# Patient Record
Sex: FEMALE | Race: WHITE | URBAN - METROPOLITAN AREA
[De-identification: names, ages, dates, MRNs, and addresses within clinical notes are randomized per-mention and may not be internally consistent; named-entity substitution may affect disease eponyms.]

---
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
## 2020-12-22 ENCOUNTER — HOSPITAL ENCOUNTER (EMERGENCY)
Age: 20
Discharge: HOME | End: 2020-12-23
Attending: EMERGENCY MEDICINE
Payer: COMMERCIAL

## 2020-12-22 ENCOUNTER — APPOINTMENT (OUTPATIENT)
Dept: GENERAL RADIOLOGY | Age: 20
End: 2020-12-22
Payer: COMMERCIAL

## 2020-12-22 VITALS
TEMPERATURE: 98.8 F | WEIGHT: 165 LBS | BODY MASS INDEX: 29.23 KG/M2 | OXYGEN SATURATION: 99 % | HEIGHT: 63 IN | HEART RATE: 62 BPM | RESPIRATION RATE: 16 BRPM | SYSTOLIC BLOOD PRESSURE: 132 MMHG | DIASTOLIC BLOOD PRESSURE: 78 MMHG

## 2020-12-22 PROCEDURE — 12011 RPR F/E/E/N/L/M 2.5 CM/<: CPT

## 2020-12-22 PROCEDURE — 99283 EMERGENCY DEPT VISIT LOW MDM: CPT

## 2020-12-22 PROCEDURE — 70140 X-RAY EXAM OF FACIAL BONES: CPT

## 2020-12-22 ASSESSMENT — PAIN SCALES - GENERAL: PAINLEVEL_OUTOF10: 3

## 2020-12-23 RX ORDER — HYDROCODONE BITARTRATE AND ACETAMINOPHEN 5; 325 MG/1; MG/1
1 TABLET ORAL EVERY 6 HOURS PRN
Qty: 4 TABLET | Refills: 0 | Status: SHIPPED | OUTPATIENT
Start: 2020-12-23 | End: 2020-12-26

## 2020-12-23 RX ORDER — IBUPROFEN 600 MG/1
600 TABLET ORAL EVERY 6 HOURS PRN
Qty: 10 TABLET | Refills: 0 | Status: SHIPPED | OUTPATIENT
Start: 2020-12-23

## 2020-12-23 NOTE — ED PROVIDER NOTES
[address] [place] [address] ED  eMERGENCY dEPARTMENT eNCOUnter      Pt Name: Dorothy Gutiérrez  MRN: [id number]  [place] 2000  Date of evaluation: 12/22/2020  Provider: Geoffery Schwab, MD    [address] [place] [address]       Chief Complaint   Patient presents with    Puncture Wound     next to right eye, hit head on window sill         HISTORY OF PRESENT ILLNESS  (Location/Symptom, Timing/Onset, Context/Setting, Quality, Duration, Modifying Factors, Severity.)   Dorothy Gutiérrez is a 23 y.o. female who presents to the emergency department for evaluation of a laceration lateral to the right eye. Patient states she tripped and fell and struck her head on the edge of a windowsill she has a 1 cm laceration lateral to the right eye. No orbital involvement. Bleeding is controlled on arrival no loss of consciousness. She denies any neck pain. Nursing Notes were reviewed. ALLERGIES     Albuterol sulfate    CURRENT MEDICATIONS       Discharge Medication List as of 12/23/2020 12:26 AM      CONTINUE these medications which have NOT CHANGED    Details   albuterol sulfate HFA (PROAIR HFA) 108 (90 Base) MCG/ACT inhaler Inhale 2 puffs into the lungs every 4 hours as needed for Wheezing or Shortness of Breath, Disp-1 Inhaler, R-0Please let patient know this will be her last refill from our office and she needs to find a family doctor/internistNormal      montelukast (SINGULAIR) 10 MG tablet TAKE 1 TABLET BY MOUTH IN THE EVENING, Disp-30 tablet, R-2Normal             PAST MEDICAL HISTORY         Diagnosis Date    Allergic     Asthma        SURGICAL HISTORY     History reviewed. No pertinent surgical history.       FAMILY HISTORY           Problem Relation Age of Onset    High Blood Pressure Mother     High Cholesterol Mother     High Blood Pressure Maternal Grandmother     High Cholesterol Maternal Grandmother     Other Maternal Grandmother         COPD, Emphysema    Depression Paternal Grandfather      Family Status Relation Name Status    Mother Garth Del Toro Father Stephanie Lopez Sister 5 sisters Alive    Brother 3 brothers Alive    MGM  Alive    MGF  Alive    PGM  Alive    PGF  Alive        SOCIAL HISTORY      reports that she has never smoked. She has never used smokeless tobacco. She reports that she does not drink alcohol or use drugs. REVIEW OF SYSTEMS    (2-9 systems for level 4, 10 or more for level 5)     Review of Systems   All other systems reviewed and are negative. Except as noted above the remainder of the review of systems was reviewed and negative. PHYSICAL EXAM    (up to 7 for level 4, 8 or more for level 5)     Vitals:    12/22/20 2251   BP: 132/78   Pulse: 62   Resp: 16   Temp: 98.8 °F (37.1 °C)   TempSrc: Oral   SpO2: 99%   Weight: 165 lb (74.8 kg)   Height: 5' 3\" (1.6 m)       Physical exam reflects a well-nourished well-hydrated female. She is afebrile with stable vital signs to include pulse ox of 99% on room air. She is not hypoxic. She is alert conversive and appropriate in behavior. Integument warm and dry. Oral pharyngeal exam is without lesion. No difficulty breathing speaking swallowing. Heart regular rate and rhythm normal S1-S2 no murmurs rubs gallops. Lungs are clear to auscultation without wheezes rales or rhonchi. Evaluation of her face demonstrates a 1 cm laceration lateral to the right eye no ocular involvement globe is intact cranial margins distinct visual acuities are grossly normal.  No active bleeding is noted. No instability or crepitance      DIAGNOSTIC RESULTS         RADIOLOGY:   Non-plain film images such as CT, Ultrasound and MRI are read by the radiologist. Plain radiographic images are visualized and preliminarily interpreted by the emergency physician with the below findings:    Interpretation per the Radiologist below, if available at the time of this note:    XR FACIAL BONES (<3 VIEWS)   Final Result   No acute abnormality. LABS:  Labs Reviewed - No data to display    All other labs were within normal range or not returned as of this dictation. EMERGENCY DEPARTMENT COURSE and DIFFERENTIAL DIAGNOSIS/MDM:   Vitals:    Vitals:    12/22/20 2251   BP: 132/78   Pulse: 62   Resp: 16   Temp: 98.8 °F (37.1 °C)   TempSrc: Oral   SpO2: 99%   Weight: 165 lb (74.8 kg)   Height: 5' 3\" (1.6 m)     Wound is evaluated cleansed and closed. Imaging studies are reviewed with regard to potential fracture. Imaging studies are negative for injury. She has no injury to the orbit as stated. She is discharged home on conservative management    CONSULTS:  None    PROCEDURES:  Wound closure: Patient has a 1 cm laceration lateral to the right orbital region. There is no orbital involvement. No active bleeding noted. Wound is closed with adhesive with good margin approximation and hemostasis obtained. Patient tolerated procedure well and no complications are noted    FINAL IMPRESSION      1. Facial laceration, initial encounter          DISPOSITION/PLAN   DISPOSITION Decision To Discharge 12/23/2020 12:22:56 AM      PATIENT REFERRED TO:   Paresh Almaraz MD  [address] [place] [address] [address]  [phone]            DISCHARGE MEDICATIONS:     Discharge Medication List as of 12/23/2020 12:26 AM      START taking these medications    Details   ibuprofen (ADVIL;MOTRIN) 600 MG tablet Take 1 tablet by mouth every 6 hours as needed for Pain, Disp-10 tablet, R-0Normal      HYDROcodone-acetaminophen (NORCO) 5-325 MG per tablet Take 1 tablet by mouth every 6 hours as needed for Pain for up to 3 days. , Disp-4 tablet, R-0Print             Controlled Substance Monitoring:    Acute and Chronic Pain Monitoring:   RX Monitoring 12/22/2020   Periodic Controlled Substance Monitoring No signs of potential drug abuse or diversion identified. (Please note that portions of this note were completed with a voice recognition program.  Efforts were made to edit the dictations but occasionally words are mis-transcribed.)    Javier Starkey MD  Attending Emergency Physician          Javier Starkey MD  12/23/20 0305